# Patient Record
Sex: FEMALE | Race: AMERICAN INDIAN OR ALASKA NATIVE
[De-identification: names, ages, dates, MRNs, and addresses within clinical notes are randomized per-mention and may not be internally consistent; named-entity substitution may affect disease eponyms.]

---

## 2020-09-26 ENCOUNTER — HOSPITAL ENCOUNTER (EMERGENCY)
Dept: HOSPITAL 5 - ED | Age: 25
Discharge: HOME | End: 2020-09-26
Payer: SELF-PAY

## 2020-09-26 VITALS — SYSTOLIC BLOOD PRESSURE: 130 MMHG | DIASTOLIC BLOOD PRESSURE: 69 MMHG
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
## 2020-09-26 DIAGNOSIS — M25.461: ICD-10-CM

## 2020-09-26 DIAGNOSIS — S83.91XA: Primary | ICD-10-CM
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
## 2020-09-26 NOTE — XRAY REPORT
RIGHT KNEE 3 VIEW(S)



INDICATION / CLINICAL INFORMATION:

right knee injury



COMPARISON:

None available.

 

FINDINGS:



BONES / JOINT(S): No acute fracture or subluxation. No significant arthritis.



SOFT TISSUES: Small suprapatellar joint effusion.



ADDITIONAL FINDINGS: None.







Signer Name: Tyler Ramos MD 

Signed: 9/26/2020 1:25 PM

Workstation Name: [id number]

## 2020-09-26 NOTE — EMERGENCY DEPARTMENT REPORT
ED Lower Extremity HPI





- General


Chief Complaint: Extremity Injury, Lower


Stated Complaint: KNEE PAIN


Time Seen by Provider: 09/26/20 13:11


Source: patient


Mode of arrival: Wheelchair


Limitations: No Limitations





- History of Present Illness


Initial Comments: 





Patient is a 25-year-old female presents emergency room with complaints of a 

right knee injury that occurred last night.  Patient states that she was at a 

skating rink and that a child ran into her skates.  She states that she fell 

directly onto her right knee.  She states that she has had increasing pain and 

swelling.  She states that she was ambulatory with a limp last night but states 

that it became too painful to ambulate today.  She denies any numbness or 

weakness.  She denies ever injuring this knee in the past.  No past medical 

history.  No allergies to medications.  Last menstrual cycle September 20, 2020.





- Related Data


                                  Previous Rx's











 Medication  Instructions  Recorded  Last Taken  Type


 


Naproxen [EC-Naprosyn] 500 mg PO BID PRN #14 tablet. 09/26/20 Unknown Rx











                                    Allergies











Allergy/AdvReac Type Severity Reaction Status Date / Time


 


No Known Allergies Allergy   Unverified 09/26/20 12:42














ED Review of Systems


ROS: 


Stated complaint: KNEE PAIN


Other details as noted in HPI





Comment: All other systems reviewed and negative





ED Past Medical Hx





- Past Medical History


Previous Medical History?: No





- Surgical History


Past Surgical History?: No





- Social History


Smoking Status: Never Smoker


Substance Use Type: Alcohol





- Medications


Home Medications: 


                                Home Medications











 Medication  Instructions  Recorded  Confirmed  Last Taken  Type


 


Naproxen [EC-Naprosyn] 500 mg PO BID PRN #14 tablet. 09/26/20  Unknown Rx














ED Physical Exam





- General


Limitations: No Limitations


General appearance: alert, in no apparent distress





- Head


Head exam: Present: atraumatic, normocephalic





- Eye


Eye exam: Present: normal appearance





- ENT


ENT exam: Present: mucous membranes moist





- Extremities Exam


Extremities exam: Present: other (ttp to the right anterior knee just inferior 

to the patella, no obvious deformity, FROM of the RLE with discomfort upon 

flexion, neurovascularly intact)





- Neurological Exam


Neurological exam: Present: alert, oriented X3





- Psychiatric


Psychiatric exam: Present: normal affect, normal mood





- Skin


Skin exam: Present: warm, dry, intact





ED Course


                                   Vital Signs











  09/26/20 09/26/20





  12:44 14:56


 


Temperature 98.8 F 


 


Pulse Rate 112 H 79


 


Respiratory 20 16





Rate  


 


Blood Pressure 130/69 


 


O2 Sat by Pulse 97 98





Oximetry  














ED Lower Extremity MDM





- Lab Data








                                   Vital Signs











  09/26/20 09/26/20





  12:44 14:56


 


Temperature 98.8 F 


 


Pulse Rate 112 H 79


 


Respiratory 20 16





Rate  


 


Blood Pressure 130/69 


 


O2 Sat by Pulse 97 98





Oximetry  














- Radiology Data


Radiology results: report reviewed





RIGHT KNEE 3 VIEW(S) 





INDICATION / CLINICAL INFORMATION: 


right knee injury 





COMPARISON: 


None available. 





FINDINGS: 





BONES / JOINT(S): No acute fracture or subluxation. No significant arthritis. 





SOFT TISSUES: Small suprapatellar joint effusion. 





ADDITIONAL FINDINGS: None. 











Signer Name: Murphy Guillen MD 


Signed: 9/26/2020 1:25 PM 


Workstation Name: [id number] 








 Transcribed By:  


 Dictated By: MURPHY GUILLEN 


 Electronically Authenticated By: MURPHY GUILLEN 


 Signed Date/Time: 09/26/20 1325 











 DD/DT: 09/26/20 1324 


 TD/TT: 





- Medical Decision Making





Patient is a 25-year-old female presents emergency room with complaints of a 

right knee injury that occurred last night.  Patient states that she was at a 

skating rink and that a child ran into her skates.  She states that she fell 

directly onto her right knee.  She states that she has had increasing pain and 

swelling.  She states that she was ambulatory with a limp last night but states 

that it became too painful to ambulate today.  She denies any numbness or 

weakness.  She denies ever injuring this knee in the past.  No past medical 

history.  No allergies to medications.  Last menstrual cycle September 20, 2020.

VSS. on exam: ttp to the right anterior knee just inferior to the patella, no 

obvious deformity, FROM of the RLE with discomfort upon flexion, neurovascularly

intact. XR right knee: BONES / JOINT(S): No acute fracture or subluxation. No 

significant arthritis. SOFT TISSUES: Small suprapatellar joint effusion. 

ADDITIONAL FINDINGS: None.  Patient given ibuprofen while in the emergency 

department.  Discussed all results with patient.  Patient placed in a knee 

immobilizer and given crutches.  Patient be referred to orthopedic doctor.  

Advised patient to please take medication as prescribed as needed.  Follow-up 

with orthopedic doctor.  Do not bear weight on the leg until you follow-up with 

the orthopedic.  Return to emergency room for any new or worsening symptoms.





- Differential Diagnosis


strain, sprain, fx, dislocation, contusion, effusion


Critical care attestation.: 


If time is entered above; I have spent that time in minutes in the direct care 

of this critically ill patient, excluding procedure time.








ED Disposition


Clinical Impression: 


 Effusion, right knee





Right knee sprain


Qualifiers:


 Encounter type: initial encounter Involved ligament of knee: unspecified 

ligament Qualified Code(s): S83.91XA - Sprain of unspecified site of right knee,

initial encounter





Disposition: DC-01 TO HOME OR SELFCARE


Is pt being admited?: No


Does the pt Need Aspirin: No


Condition: Stable


Instructions:  Knee Sprain (ED)


Additional Instructions: 


please take medication as prescribed as needed.  Follow-up with orthopedic 

doctor.  Do not bear weight on the leg until you follow-up with the orthopedic. 

Return to emergency room for any new or worsening symptoms.


Prescriptions: 


Naproxen [EC-Naprosyn] 500 mg PO BID PRN #14 tablet.dr


 PRN Reason: pain


Referrals: 


DEB BROWN MD [Staff Physician] - 3-5 Days


[facility] [Provider Group] - 3-5 Days


Forms:  Work/School Release Form(ED)


Time of Disposition: 13:56


Print Language: ENGLISH